# Patient Record
Sex: FEMALE | Employment: UNEMPLOYED | URBAN - METROPOLITAN AREA
[De-identification: names, ages, dates, MRNs, and addresses within clinical notes are randomized per-mention and may not be internally consistent; named-entity substitution may affect disease eponyms.]

---
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
## 2023-05-25 ENCOUNTER — TELEPHONE (OUTPATIENT)
Dept: OPHTHALMOLOGY | Facility: CLINIC | Age: 18
End: 2023-05-25
Payer: COMMERCIAL

## 2023-06-13 ENCOUNTER — TELEPHONE (OUTPATIENT)
Dept: OPHTHALMOLOGY | Facility: CLINIC | Age: 18
End: 2023-06-13
Payer: COMMERCIAL

## 2023-11-20 ENCOUNTER — HOSPITAL ENCOUNTER (EMERGENCY)
Facility: CLINIC | Age: 18
Discharge: HOME | End: 2023-11-20
Attending: PSYCHIATRY & NEUROLOGY | Admitting: PSYCHIATRY & NEUROLOGY
Payer: COMMERCIAL

## 2023-11-20 VITALS
WEIGHT: 111.4 LBS | BODY MASS INDEX: 16.45 KG/M2 | HEART RATE: 84 BPM | DIASTOLIC BLOOD PRESSURE: 73 MMHG | TEMPERATURE: 98.5 F | OXYGEN SATURATION: 97 % | SYSTOLIC BLOOD PRESSURE: 109 MMHG | RESPIRATION RATE: 16 BRPM

## 2023-11-20 DIAGNOSIS — F43.23 ADJUSTMENT DISORDER WITH MIXED ANXIETY AND DEPRESSED MOOD: ICD-10-CM

## 2023-11-20 PROBLEM — F33.9 MAJOR DEPRESSIVE DISORDER, RECURRENT, UNSPECIFIED (H): Status: ACTIVE | Noted: 2023-11-20

## 2023-11-20 LAB
AMPHETAMINES UR QL SCN: NORMAL
BARBITURATES UR QL SCN: NORMAL
BENZODIAZ UR QL SCN: NORMAL
BZE UR QL SCN: NORMAL
CANNABINOIDS UR QL SCN: NORMAL
FENTANYL UR QL: NORMAL
HCG UR QL: NEGATIVE
OPIATES UR QL SCN: NORMAL
PCP QUAL URINE (ROCHE): NORMAL

## 2023-11-20 PROCEDURE — 81025 URINE PREGNANCY TEST: CPT | Performed by: PSYCHIATRY & NEUROLOGY

## 2023-11-20 PROCEDURE — 80307 DRUG TEST PRSMV CHEM ANLYZR: CPT | Performed by: PSYCHIATRY & NEUROLOGY

## 2023-11-20 PROCEDURE — 99284 EMERGENCY DEPT VISIT MOD MDM: CPT | Performed by: PSYCHIATRY & NEUROLOGY

## 2023-11-20 PROCEDURE — 99283 EMERGENCY DEPT VISIT LOW MDM: CPT

## 2023-11-20 ASSESSMENT — ACTIVITIES OF DAILY LIVING (ADL): ADLS_ACUITY_SCORE: 35

## 2023-11-21 NOTE — DISCHARGE INSTRUCTIONS
A referral will be made to [facility] for their Day Program. [facility] will review this referral to determine if you are a good fit for their program. If they accept, they will reach out to you directly once an opening becomes available. Please call [employer] at [phone] follow-up on the status of this referral.        A referral will be made to [facility] for their Day Program. [facility] IOPwill review this referral to determine if you are a good fit for their program. If they accept, they will reach out to you directly once an opening becomes available. Please call [facility] at [phone] follow-up on the status of this referral.        A referral will be made to [facility] at [facility] for their Day Program. [facility]  will review this referral to determine if you are a good fit for their program. If they accept, they will reach out to you directly once an opening becomes available. Please call Adult [facility] Treatment [facility] at [facility]  at [phone] to follow-up on the status of this referral.       You are scheduled for the following appointments:  Date: Wednesday, 11/22/2023  Time: 10:00 am - 11:00 am  Provider: Araceli Braswell  Location: [facility], [address] [place] [address]  Phone: [phone]  Type: Teletherapy -FAMILY    Patient Instructions  For all appointments: you will be sent information to fill out the intake paperwork online. Please fill the paperwork prior to your appointment. For telehealth appointments: you will also be sent a zoom link to attend the appointment remotely.     Date: Wednesday, 11/22/2023  Time: 1:00 pm - 2:00 pm  Provider: Petra ARAMBULA PA-C  Location: [facility], [address] [place] [address], [address], [place] [address]  Phone: [phone]  Type: Telepsychiatry    Patient Instructions  Please fill  New Patient Form by using following link. All forms need to be completed 24 hours prior to the appointment date/time by going to [email or web address] Please call us on [phone] 24 hours prior to your scheduled appointment to confirm that you are able to attend. We will provide you information about how to log into video call software when you call.      Date: Wednesday, 11/22/2023  Time: 5:00 pm - 6:00 pm  Provider: Celestina Erwin MA  [facility]  Location: [facility], [address] [place] [address], [address], [place] [address]  Phone: [phone]  Type: Teletherapy - INDIVIDUAL    Patient Instructions  Please fill New Patient Form by using following link. All forms need to be completed 24 hours prior to the appointment date/time by going to [email or web address] Please call us on [phone] 24 hours prior to your scheduled appointment to confirm that you are able to attend. We will provide you information about how to log into video call software when you call.      Aftercare Plan    Follow up with established providers and supports as scheduled. Continue taking medications as prescribed. Abstain from drugs and alcohol. Utilize your [facility] crisis team as needed. They are available 24/7. Contact information is listed below.     Recommendations include: referral for programmatic care (PHP - stands for partial hospitalization programming, or IOP - stands for intensive outpatient programming), outpatient psychiatry, outpatient therapy, and family therapy. You agreed to contract for safety: if your symptoms increase and/or you can not contract for safety - you will go to the nearest emergency room. You and your father agreed to locking up your medications and over the counter medications.        If I am feeling unsafe or I am in a crisis, I will:   Contact my established care providers   Call the National Suicide Prevention Lifeline: 450.337.5137   Go to the nearest emergency  room   Call 911     Warning signs that I or other people might notice when a crisis is developing for me: changes to sleep, appetite or mood, increased anger, agitation or irritability, feeling depressed or hopeless, spending more time alone or talking less, increased crying, decreased productivity, seeing or hearing things that aren't there, thoughts of not wanting to live anymore or of actually killing myself, thoughts of hurting others    Things I am able to do on my own to cope or help me feel better: watching a favorite tv show or movie, listening to music I enjoy, going outside and breathing fresh air, going for a walk or exercising, taking a shower or bath, a cold or hot beverage, a healthy snack, drawing/coloring/painting, journaling, singing or dancing, deep breathing     I can try practicing square breathing when I begin to feel anxious - inhale through the nose for the count of 4 and the first line on the square. Exhale through the mouth for the count of 4 for the second line of the square. Repeat to complete the square. Repeat the square as many times as needed.    I can also use my five senses to practice mindfulness and grounding. What are five things I can see, four things I can hear, three things I can feel, two things I can smell, and one thing I can taste.     Things that I am able to do with others to cope or help me feel better: sometimes just talking or spending time with someone else, sharing a meal or having coffee, watching a movie or playing a game, going for a walk or exercising    I can also use community resources including mental health hotlines, [facility] crisis teams, or apps.     Things I can use or do for distraction: movies/tv, music, reading, games, drawing/coloring/painting or other art, essential oils, exercise, cleaning/organizing, puzzles, crossword puzzles, word search, Sudoku       I can also download a meditation or relaxation jordin, like Calm, Headspace, or Insight Timer (all  "three offer a free version)    Changes I can make to support my mental health and wellness: Attend scheduled mental health therapy and psychiatric appointments. Take my medications as prescribed. Maintain a daily schedule/routine. Abstain from all mood altering substances, including drugs, alcohol, or medications not currently prescribed to me. Implement a self-care routine.      People in my life that I can ask for help: friends or family, trusted teachers/staff/colleagues, trusted members of my community or place of Sabianism, mental health crisis lines, or 911    Your [facility] has a mental health crisis team you can call 24/7: [facility] Adult, [phone]    Other things that are important when I m in crisis: to remember that the feelings I am having right now are temporary, and it won't feel like this forever, and that it is okay and important to ask for help    Crisis Lines  Crisis Text Line  Text 403192  You will be connected with a trained live crisis counselor to provide support.    Por espanol, texto  YOVANY a 619187 o texto a 442-AYUDAME en WhatsApp    National Hope Line  1.800.SUICIDE [2426842]    Community Resources  Fast Tracker  Linking people to mental health and substance use disorder resources  fasttrackermn.org     [place] Mental [facility] Warm Line  Peer to peer support  Monday thru Saturday, 12 pm to 10 pm  643.399.4250 or 1.108.542.4312  Text \"Support\" to 31892    National [place] on Mental Illness (RENZO)  622.865.4931 or 1.888.RENZO.HELPS    Mental Health Apps  My3  https://my[employer] (P)pp.org/    VirtualHopeBox  https://[employer].org/apps/virtual-hope-box/      Additional Information  Today you were seen by a licensed mental health professional through Triage and Transition services, [facility]  for a crisis assessment in the Emergency Department at [facility].  It is recommended that you follow up with your established providers (psychiatrist, mental " health therapist, and/or primary care doctor - as relevant) as soon as possible. Coordinators from [facility] will be calling you in the next 24-48 hours to ensure that you have the resources you need.  You can also contact [facility] coordinators directly at [phone]. You may have been scheduled for or offered an appointment with a mental health provider. [facility] maintains an extensive network of licensed behavioral health providers to connect patients with the services they need.  We do not charge providers a fee to participate in our referral network.  We match patients with providers based on a patient's specific needs, insurance coverage, and location.  Our first effort will be to refer you to a provider within your care system, and will utilize providers outside your care system as needed.

## 2023-11-21 NOTE — CONSULTS
"Diagnostic Evaluation Consultation  Crisis Assessment    Patient Name: Brittany Curry  Age:  18 year old  Legal Sex: female  Gender Identity: female  Pronouns: unknown  Race: Choose not to Answer  Ethnicity: Choose not to answer  Language: English    Patient was assessed: In person      Patient location: [facility] EMERGENCY DEPARTMENT                             [facility]    Referral Data and Chief Complaint  Brittany Curry presents to the ED with family/friends. Patient is presenting to the ED for the following concerns: Paranoia, Depression, Suicidal ideation, Anxiety.   Factors that make the mental health crisis life threatening or complex are:  Patient reports she feels suicidal with plan (to overdose on medications), but no intent. Patient endorses increased depression symptoms that include: tearfulness, sad mood, loss of motivation, suicidal ideation. She also stated she experiences anxiety and paranoia (her mother is watching her and her friends are talking about her). Patient said she has a history of depression, anxiety, and paranoia. However, the past two weeks they symptoms have increased. The trigger she can identify is that she, \"...got behind in school, and is now truant.\" She has not told anyone about the truancy. Patient stated she is legally blind. Patient denies hallucinations, denies SIB, stated she had one prior attempt when she was in middle school - overdosed on pills. Patient is able to contract for safety.    Informed Consent and Assessment Methods  Explained the crisis assessment process, including applicable information disclosures and limits to confidentiality, assessed understanding of the process, and obtained consent to proceed with the assessment.  Assessment methods included conducting a formal interview with patient, review of medical records, collaboration with medical staff, and obtaining relevant collateral information from family and community providers when available.  : " "done     Patient response to interventions: acceptance expressed, eager to participate  Coping skills were attempted to reduce the crisis:  Patient shared with her friends her feelings, symptoms, and asked her father to bring her to the ED.     History of the Crisis   Patient stated she was admitted for one day at [facility] for SI, approximately one year ago. And attempted suicide by overdose when she was in middle school. She reports her depression, anxiety, and paranoia are part of baseline symptoms. It appears the trigger from \"truancy\" is increasing symptoms.    Brief Psychosocial History  Family:  Single, Children no  Support System:  Other (specify) (Father, and friends)  Employment Status:  disabled  Source of Income:  disability  Financial Environmental Concerns:  none  Current Hobbies:  arts/crafts, music, other (see comments) (and video games)  Barriers in Personal Life:  mental health concerns    Significant Clinical History  Current Anxiety Symptoms:  anxious  Current Depression/Trauma:  thoughts of death/suicide, crying or feels like crying, irritable  Current Somatic Symptoms:  anxious  Current Psychosis/Thought Disturbance:  agitation (Paranoia: her mother is watching her, and friends are talking about her. Patient reports paranoia is part of her baseline, however, symptoms have increased over the past two weeks.)  Current Eating Symptoms:  loss of appetite  Chemical Use History:  Alcohol: None  Benzodiazepines: None  Opiates: None  Cocaine: None  Marijuana: None  Other Use: None  Withdrawal Symptoms:  (none noted)  Addictions:  (none noted)   Past diagnosis:  Depression, Anxiety Disorder  Family history:  Bipolar Disorder, Anxiety Disorder, Depression  Past treatment:  Individual therapy, Inpatient Hospitalization  Details of most recent treatment:  Patient saw an outpatient therapist approximately one year ago, but did not think it was a good fit.     Collateral Information  Is there collateral " "information: Yes     Collateral information name, relationship, phone number:  Father, Nile Curry, [phone]    What happened today: Father stated he does not know what is going on, because, \"she doesn't tell me anything.\" He said his daughter asked him to bring her to the ED.     What is different about patient's functioning: Father has not noticed different behaviors.     Concern about alcohol/drug use:  none    What do you think the patient needs:  medications    Has patient made comments about wanting to kill themselves/others: no    If d/c is recommended, can they take part in safety/aftercare planning:  yes    Risk Assessment  [place] Suicide Severity Rating Scale Full Clinical Version:  Suicidal Ideation  Q1 Wish to be Dead (Lifetime): Yes  Q2 Non-Specific Active Suicidal Thoughts (Lifetime): Yes  3. Active Suicidal Ideation with any Methods (Not Plan) Without Intent to Act (Lifetime): Yes  Q4 Active Suicidal Ideation with Some Intent to Act, Without Specific Plan (Lifetime): Yes  Q5 Active Suicidal Ideation with Specific Plan and Intent (Lifetime): Yes  Q6 Suicide Behavior (Lifetime): yes (patient reports she overdosed in middle school)     Suicidal Behavior (Lifetime)  Actual Attempt (Lifetime): Yes  Total Number of Actual Attempts (Lifetime): 1  Actual Attempt Description (Lifetime): overdosed in middle school  Has subject engaged in non-suicidal self-injurious behavior? (Lifetime): No  Interrupted Attempts (Lifetime): No  Aborted or Self-Interrupted Attempt (Lifetime): No  Preparatory Acts or Behavior (Lifetime): No    [place] Suicide Severity Rating Scale Recent:   Suicidal Ideation (Recent)  Q1 Wished to be Dead (Past Month): yes  Q2 Suicidal Thoughts (Past Month): yes  Q3 Suicidal Thought Method: yes (overdosing)  Q4 Suicidal Intent without Specific Plan: no  Q5 Suicide Intent with Specific Plan: no  Level of Risk per Screen: moderate risk  Intensity of Ideation (Recent)  Frequency (Past 1 " Month): 2-5 times in week  Duration (Past 1 Month): Less than 1 hour/some of the time  Controllability (Past 1 Month): Can control thoughts with some difficulty  Deterrents (Past 1 Month): Deterrents definitely stopped you from attempting suicide  Reasons for Ideation (Past 1 Month): Mostly to end or stop the pain (You couldn't go on living with the pain or how you were feeling)  Suicidal Behavior (Recent)  Actual Attempt (Past 3 Months): No  Has subject engaged in non-suicidal self-injurious behavior? (Past 3 Months): No  Interrupted Attempts (Past 3 Months): No  Aborted or Self-Interrupted Attempt (Past 3 Months): No  Preparatory Acts or Behavior (Past 3 Months): No    Environmental or Psychosocial Events: other (see comment) (Trigger includes no longer attending school)  Protective Factors: Protective Factors: strong bond to family unit, community support, or employment, lives in a responsibly safe and stable environment    Does the patient have thoughts of harming others? Feels Like Hurting Others: no  Previous Attempt to Hurt Others: no  Current presentation: Irritable (Patient presented sad during the session. She reports increased irritability (yelling at friends).)  Violence Threats in Past 6 Months: none  Current Violence Plan or Thoughts: none  Is the patient engaging in sexually inappropriate behavior?: no  Duty to warn initiated: no    Is the patient engaging in sexually inappropriate behavior?  no        Mental Status Exam   Affect: Other (sad, depressed)  Appearance: Appropriate  Attention Span/Concentration: Attentive  Eye Contact: Variable (patient is legally blind)    Fund of Knowledge: Appropriate   Language /Speech Content: Fluent  Language /Speech Volume: Normal  Language /Speech Rate/Productions: Normal  Recent Memory: Intact  Remote Memory: Intact  Mood: Sad, Anxious, Irritable  Orientation to Person: Yes   Orientation to Place: Yes  Orientation to Time of Day: Yes  Orientation to Date: Yes    "  Situation (Do they understand why they are here?): Yes  Psychomotor Behavior: Normal  Thought Content: Suicidal, Paranoia  Thought Form: Paranoia     Medication  No current facility-administered medications for this encounter.     Current Outpatient Medications   Medication    hydrOXYzine (ATARAX) 25 MG tablet    IBUPROFEN PO    naproxen (NAPROSYN) 250 MG tablet    rizatriptan (MAXALT) 10 MG tablet    verapamil (CALAN) 80 MG tablet    VIENVA 0.1-20 MG-MCG tablet      Psychotropic medications:   Medication Orders - Psychiatric (From admission, onward)      None             Current Care Team  Patient Care Team:  No Ref-Primary, Physician as PCP - General  Casper Ortiz MD as MD (Pediatric Neurology)  Henna Newman MD as MD (Ophthalmology)  Juvenal Ann OD as MD (Optometry)  Zahra Johnson MD as MD (Neurology)  Partha Pimentel OD (Optometry)  Partha Pimentel OD as Assigned Surgical Provider    Diagnosis  Patient Active Problem List   Diagnosis Code    Major depressive disorder, recurrent, unspecified (H24) F33.9       Primary Problem This Admission  Active Hospital Problems    Major depressive disorder, recurrent, unspecified (H24)      Clinical Summary and Substantiation of Recommendations   Patient reports increased suicidal ideation, with plan (to overdose on medications), and no intent. Her depression symptoms have increased over the past two weeks. Patient was able to identify a trigger - getting behind in school and becoming, \"truant.\" Patient has not told anyone in her support network. She endorses increased tearfulness, increased SI (2-3 times a week, for approximately one hour), loss of motivation, feeling sad and down, anxiety, and paranoia (feeling her mother is watching her and her friends are talking about her). Patient reports the anxiety, depression, and paranoia are part of her baseline. She has experienced them since she was a child. The symptoms have increased over the past " "two weeks. And her friends told her, \"if you don't get help, we can't be friends.\" Patient is able to contract for safety, and is motivated for treatment. Her father is included in the safety planning. He agreed to lock-up her medications and only give her one week at a time, and patient agreed to go to the nearest emergency room if her symptoms increase and/or she cannot contract for safety. Patient has no outside providers and is not taking psychotropic medications. Based on patient's ability to contract for safety, her father included in the safety plan, help seeking skills, and patient's motivation for treatment, writer recommends discharge with referrals for programmatic care, outpatient psychiatry, outpatient therapy, and family therapy. The attending provider and patient agree to the plan.    Patient coping skills attempted to reduce the crisis:  Patient shared with her friends her feelings, symptoms, and asked her father to bring her to the ED.    Disposition  Recommended disposition: Individual Therapy, Family Therapy, Medication Management, Programmatic Care        Reviewed case and recommendations with attending provider. Attending Name: Dr. Albrecht       Attending concurs with disposition: yes       Patient and/or validated legal guardian concurs with disposition:   yes       Final disposition:  discharge    Legal status on admission:  voluntary    Assessment Details   Total duration spent with the patient: 60 min     CPT code(s) utilized: 89455 - Psychotherapy for Crisis - 60 (30-74*) min    Nancy Ramos [facility] Services[facility]AND[facility], Psychotherapist  [facility] - Triage & Transition Services  Callback: [phone]          "

## 2023-11-21 NOTE — ED PROVIDER NOTES
"    [facility] [place] EMERGENCY DEPARTMENT ([facility])    11/20/23      ED PROVIDER NOTE   [facility] ED H - E       History     Chief Complaint   Patient presents with    Mental Health Problem     Pt states she has been having lots of anxiety with racing thoughts for months.      The history is provided by the patient and medical records.   Mental Health Problem    Brittany Curry is a 18 year old female who presents to the emergency department for evaluation of depression, paranoia, anxiety and suicidal ideation.  Per discussion with DEC , patient indicates she has been having increasing suicidal ideation with a specific plan to overdose with no intent.  Patient is able to contract safety, and indicates that she would return if thoughts reemerge.  Patient lives with her father, who has locked up all medications including over-the-counter meds.  Patient reports that her depression symptoms have been getting worse over the past 2 weeks.  She has increased tearfulness, with suicidal ideation 2-3 times a week, with each episode lasting a few hours each time.  Patient also endorses ongoing paranoia, stating that she \"feels like her mom is watching her\" and \"that her friends are talking about her \".  Patient reports that symptoms of paranoia is a baseline for her, and has been on and off since she was a child.  Patient does not have a psychiatrist or therapist.  DEC recommendation is to discharge her with referral for [facility], outpatient therapy, psychiatric med provider, and family therapy.  Patient and family are motivated.    Past Medical History  Past Medical History:   Diagnosis Date    Nystagmus      Past Surgical History:   Procedure Laterality Date    FOOT SURGERY       hydrOXYzine (ATARAX) 25 MG tablet  IBUPROFEN PO  naproxen (NAPROSYN) 250 MG tablet  rizatriptan (MAXALT) 10 MG tablet  verapamil (CALAN) 80 MG tablet  VIENVA 0.1-20 MG-MCG tablet      Allergies   Allergen Reactions    Pineapple      Family History  Family " History   Problem Relation Age of Onset    Glaucoma Other      Social History   Social History     Tobacco Use    Smoking status: Never     Passive exposure: Yes    Smokeless tobacco: Never    Tobacco comments:     Mom and Mom's Boyfriend smoke inside home         A complete review of systems was performed with pertinent positives and negatives noted in the HPI, and all other systems negative.    Physical Exam   BP: 132/81  Pulse: 101  Temp: 98.5  F (36.9  C)  Resp: 18  Weight: 50.5 kg (111 lb 6.4 oz)  SpO2: 96 %  Physical Exam  Vitals and nursing note reviewed.   HENT:      Head: Normocephalic.   Eyes:      Pupils: Pupils are equal, round, and reactive to light.   Pulmonary:      Effort: Pulmonary effort is normal.   Musculoskeletal:         General: Normal range of motion.      Cervical back: Normal range of motion.   Neurological:      General: No focal deficit present.      Mental Status: She is alert.   Psychiatric:         Attention and Perception: Attention and perception normal.         Mood and Affect: Affect normal. Mood is anxious.         Speech: Speech normal.         Behavior: Behavior normal. Behavior is not agitated, slowed, aggressive, hyperactive or combative. Behavior is cooperative.         Thought Content: Thought content normal. Thought content is not paranoid or delusional. Thought content does not include homicidal or suicidal ideation.         Cognition and Memory: Cognition and memory normal.         Judgment: Judgment normal.         ED Course, Procedures, & Data      Procedures       A consult was attained from the  DEC service. The case was discussed with the  from that service. The consulting service's recommendations were provided at 7 PM. 10 minutes spent discussing case, care and disposition. 10 minutes spent reviewing prior records and intervention and discussion with father.       Results for orders placed or performed during the hospital encounter of 11/20/23   HCG  qualitative urine (UPT)     Status: Normal   Result Value Ref Range    hCG Urine Qualitative Negative Negative   Urine Drug Screen Panel     Status: Normal   Result Value Ref Range    Amphetamines Urine Screen Negative Screen Negative    Barbituates Urine Screen Negative Screen Negative    Benzodiazepine Urine Screen Negative Screen Negative    Cannabinoids Urine Screen Negative Screen Negative    Cocaine Urine Screen Negative Screen Negative    Fentanyl Qual Urine Screen Negative Screen Negative    Opiates Urine Screen Negative Screen Negative    PCP Urine Screen Negative Screen Negative   Urine Drug Screen     Status: Normal    Narrative    The following orders were created for panel order Urine Drug Screen.  Procedure                               Abnormality         Status                     ---------                               -----------         ------                     Urine Drug Screen Panel[[id number]]      Normal              Final result                 Please view results for these tests on the individual orders.     Medications - No data to display  Labs Ordered and Resulted from Time of ED Arrival to Time of ED Departure   HCG QUALITATIVE URINE - Normal       Result Value    hCG Urine Qualitative Negative     URINE DRUG SCREEN PANEL - Normal    Amphetamines Urine Screen Negative      Barbituates Urine Screen Negative      Benzodiazepine Urine Screen Negative      Cannabinoids Urine Screen Negative      Cocaine Urine Screen Negative      Fentanyl Qual Urine Screen Negative      Opiates Urine Screen Negative      PCP Urine Screen Negative       No orders to display          Critical care was not performed.     Medical Decision Making  The patient's presentation was of moderate complexity (an undiagnosed new problem with uncertain diagnosis).    The patient's evaluation involved:  an assessment requiring an independent historian (see separate area of note for details)  review of external note(s) from  3+ sources (see separate area of note for details)  ordering and/or review of 1 test(s) in this encounter (see separate area of note for details)    The patient's management necessitated moderate risk (limitations due to social determinants of health (inadequate community MH support)) and high risk (a decision regarding hospitalization).    Assessment & Plan    Patient is here accompanied by father with concerns for feeling overwhelmed and suicidal. Patient currently has no MH support and she feels hopeless and helpless. She as been having suicidal thoughts. She feels she can safety contract if services are set up. Father is willing to secure the home to reduce access to meds. [facility] made referral for PHP, therapy, med provider and family therapy as family conflict has been a source of triggering stress for her. Father is comfortable with having patient come home. She can be discharged. I do not find her holdable.    I have reviewed the nursing notes. I have reviewed the findings, diagnosis, plan and need for follow up with the patient.    New Prescriptions    No medications on file       Final diagnoses:   Adjustment disorder with mixed anxiety and depressed mood       Lucio Albrecht I, Rodrick Griffith, am serving as a trained medical scribe to document services personally performed by Lucio Albrecht MD based on the provider's statements to me on November 20, 2023.  This document has been checked and approved by the attending provider.    ILucio MD, was physically present and have reviewed and verified the accuracy of this note documented by Rodrick Griffith medical scribe.      Lucio Albrecht MD    [facility] EMERGENCY DEPARTMENT  11/20/2023     Lucio Albrecht MD  11/20/23 2021

## 2024-06-28 ENCOUNTER — LAB REQUISITION (OUTPATIENT)
Dept: LAB | Facility: CLINIC | Age: 19
End: 2024-06-28

## 2024-06-28 DIAGNOSIS — Z11.59 ENCOUNTER FOR SCREENING FOR OTHER VIRAL DISEASES: ICD-10-CM

## 2024-06-28 DIAGNOSIS — Z11.3 ENCOUNTER FOR SCREENING FOR INFECTIONS WITH A PREDOMINANTLY SEXUAL MODE OF TRANSMISSION: ICD-10-CM

## 2024-06-28 LAB
HCV AB SERPL QL IA: NONREACTIVE
HIV 1+2 AB+HIV1 P24 AG SERPL QL IA: NONREACTIVE

## 2024-06-28 PROCEDURE — 86780 TREPONEMA PALLIDUM: CPT | Performed by: FAMILY MEDICINE

## 2024-06-28 PROCEDURE — 86803 HEPATITIS C AB TEST: CPT | Performed by: FAMILY MEDICINE

## 2024-06-28 PROCEDURE — 87389 HIV-1 AG W/HIV-1&-2 AB AG IA: CPT | Performed by: FAMILY MEDICINE

## 2024-06-29 LAB — T PALLIDUM AB SER QL: NONREACTIVE

## 2025-07-01 NOTE — LETTER
Return to  School Release    Date: 11/16/2017      Name: Brittany Curry                       YOB: 2005    Medical Record Number: [id number]    The patient was seen at: [facility]          _________________________  Vivi Dill CMA   Name band;